# Patient Record
Sex: MALE | URBAN - METROPOLITAN AREA
[De-identification: names, ages, dates, MRNs, and addresses within clinical notes are randomized per-mention and may not be internally consistent; named-entity substitution may affect disease eponyms.]

---

## 2019-05-22 ENCOUNTER — APPOINTMENT (OUTPATIENT)
Age: 44
Setting detail: DERMATOLOGY
End: 2019-05-23

## 2019-05-22 VITALS — WEIGHT: 160 LBS | HEIGHT: 67 IN | RESPIRATION RATE: 14 BRPM

## 2019-05-22 DIAGNOSIS — L60.8 OTHER NAIL DISORDERS: ICD-10-CM

## 2019-05-22 PROCEDURE — OTHER NAIL CLIPPING FOR PAS: OTHER

## 2019-05-22 PROCEDURE — 99213 OFFICE O/P EST LOW 20 MIN: CPT

## 2019-05-22 PROCEDURE — OTHER COUNSELING: OTHER

## 2019-05-22 PROCEDURE — OTHER ORDER TESTS: OTHER

## 2019-05-22 PROCEDURE — OTHER PRESCRIPTION: OTHER

## 2019-05-22 RX ORDER — GENTAMICIN SULFATE 0.3 %
0.3% DROPS OPHTHALMIC (EYE) TID
Qty: 1 | Refills: 3 | Status: ERX | COMMUNITY
Start: 2019-05-22

## 2019-05-22 ASSESSMENT — LOCATION SIMPLE DESCRIPTION DERM: LOCATION SIMPLE: LEFT GREAT TOE

## 2019-05-22 ASSESSMENT — LOCATION ZONE DERM
LOCATION ZONE: TOENAIL
LOCATION ZONE: TOE

## 2019-05-22 ASSESSMENT — LOCATION DETAILED DESCRIPTION DERM
LOCATION DETAILED: LEFT DISTAL PLANTAR GREAT TOE
LOCATION DETAILED: LEFT GREAT TOENAIL

## 2019-05-22 NOTE — HPI: NAIL DISCOLORATION (MELANONYCHIA)
Is This A New Presentation, Or A Follow-Up?: Nail Discoloration
Additional History: Used ciclopyrox for 3 months with no improvement.

## 2019-05-22 NOTE — PROCEDURE: COUNSELING
Detail Level: Detailed
Patient Specific Counseling (Will Not Stick From Patient To Patient): Advised that this may not be fungus, but rather a result of the trauma that he reports leading up to this problem again. This dark color may be a result of pseudomonas bacteria or from debris that gets under the lifted now. Recommended a aerobic bacterial culture and putting for PAS steam to help guide treatment. In the meantime will prescribe gentamicin drops to be used three times per day empirically. Discuss the potential option for permanent nail removal and phenolization should tx fail. Recommend fine gentamicin three times per day for three months.

## 2019-06-05 ENCOUNTER — APPOINTMENT (OUTPATIENT)
Age: 44
Setting detail: DERMATOLOGY
End: 2019-06-05

## 2019-06-05 DIAGNOSIS — B35.1 TINEA UNGUIUM: ICD-10-CM

## 2019-06-05 PROCEDURE — 36415 COLL VENOUS BLD VENIPUNCTURE: CPT

## 2019-06-05 PROCEDURE — OTHER VENIPUNCTURE: OTHER

## 2019-06-05 PROCEDURE — OTHER ORDER TESTS: OTHER

## 2019-06-05 NOTE — PROCEDURE: VENIPUNCTURE
Bill For Individual Tests Below?: no
Number Of Tubes Drawn: 1
Venipuncture Paragraph: An alcohol pad was applied to the venipuncture site: L AC Fossa. Venipuncture was performed using a butterfly needle. Pressure and a bandaid was applied to the site. No complications were noted.
Detail Level: None

## 2019-08-01 ENCOUNTER — RX ONLY (RX ONLY)
Age: 44
End: 2019-08-01

## 2019-08-01 RX ORDER — TERBINAFINE HYDROCHLORIDE 250 MG/1
250MG TABLET ORAL QD
Qty: 30 | Refills: 0 | Status: ERX

## 2019-08-29 ENCOUNTER — APPOINTMENT (OUTPATIENT)
Age: 44
Setting detail: DERMATOLOGY
End: 2019-08-29

## 2019-08-29 VITALS — RESPIRATION RATE: 16 BRPM | WEIGHT: 165 LBS | HEIGHT: 66 IN

## 2019-08-29 DIAGNOSIS — B35.1 TINEA UNGUIUM: ICD-10-CM

## 2019-08-29 PROCEDURE — 36415 COLL VENOUS BLD VENIPUNCTURE: CPT

## 2019-08-29 PROCEDURE — 99213 OFFICE O/P EST LOW 20 MIN: CPT | Mod: 25

## 2019-08-29 PROCEDURE — OTHER COUNSELING: OTHER

## 2019-08-29 PROCEDURE — OTHER ORDER TESTS: OTHER

## 2019-08-29 PROCEDURE — OTHER VENIPUNCTURE: OTHER

## 2019-08-29 PROCEDURE — OTHER PRESCRIPTION: OTHER

## 2019-08-29 RX ORDER — TERBINAFINE HYDROCHLORIDE 250 MG/1
250MG TABLET ORAL
Qty: 30 | Refills: 1 | Status: ERX

## 2019-08-29 RX ORDER — CICLOPIROX 8 %
8% SOLUTION, NON-ORAL TOPICAL QD
Qty: 1 | Refills: 5 | Status: ERX | COMMUNITY
Start: 2019-08-29

## 2019-08-29 ASSESSMENT — LOCATION ZONE DERM
LOCATION ZONE: ARM
LOCATION ZONE: TOE

## 2019-08-29 ASSESSMENT — LOCATION DETAILED DESCRIPTION DERM
LOCATION DETAILED: RIGHT ANTECUBITAL SKIN
LOCATION DETAILED: LEFT DORSAL GREAT TOE

## 2019-08-29 ASSESSMENT — LOCATION SIMPLE DESCRIPTION DERM
LOCATION SIMPLE: RIGHT UPPER ARM
LOCATION SIMPLE: LEFT GREAT TOE

## 2019-08-29 NOTE — PROCEDURE: VENIPUNCTURE
Bill For Individual Tests Below?: no
Venipuncture Paragraph: - An alcohol pad was applied to the venipuncture site. \\n- Venipuncture was performed using a butterfly needle. \\n- Pressure and a band-aid was applied to the site. \\n- No complications were noted.
Number Of Tubes Drawn: 1
Detail Level: None

## 2019-08-29 NOTE — PROCEDURE: COUNSELING
Detail Level: Detailed
Patient Specific Counseling (Will Not Stick From Patient To Patient): - Reviewed the treatment course of oral terbinafine plus topical ciclopirox nail lacquer.\\n- Advised that it is normal for there to be little signs of improvement 1 month after starting the medication. However it appears that the pseudomonas has resolved. May discontinue gentamicin drops\\n- Blood work with office visit will be necessary today as well as 2 month from now. \\nPatient expressed understanding.

## 2019-08-29 NOTE — HPI: INFECTION (ONYCHOMYCOSIS)
How Severe Is It?: mild
Is This A New Presentation, Or A Follow-Up?: Follow Up Onychomycosis
Additional History: Patient has nail fungus and bateria in nail treated with gentamycon drops and was started on terbinafine. However he never followed up until now. However he reports improvement.  He started the terbinafine 3.5 weeks ago.

## 2019-10-30 ENCOUNTER — APPOINTMENT (OUTPATIENT)
Age: 44
Setting detail: DERMATOLOGY
End: 2019-10-30

## 2019-10-30 VITALS — WEIGHT: 160 LBS | HEIGHT: 67 IN | RESPIRATION RATE: 16 BRPM

## 2019-10-30 DIAGNOSIS — B35.1 TINEA UNGUIUM: ICD-10-CM

## 2019-10-30 PROCEDURE — 99213 OFFICE O/P EST LOW 20 MIN: CPT | Mod: 25

## 2019-10-30 PROCEDURE — OTHER VENIPUNCTURE: OTHER

## 2019-10-30 PROCEDURE — OTHER COUNSELING: OTHER

## 2019-10-30 PROCEDURE — OTHER ORDER TESTS: OTHER

## 2019-10-30 PROCEDURE — 36415 COLL VENOUS BLD VENIPUNCTURE: CPT

## 2019-10-30 PROCEDURE — OTHER PRESCRIPTION: OTHER

## 2019-10-30 RX ORDER — ITRACONAZOLE 100 MG/1
100MG CAPSULE ORAL ONCE A DAY
Qty: 60 | Refills: 0 | Status: ERX | COMMUNITY
Start: 2019-10-30

## 2019-10-30 ASSESSMENT — LOCATION ZONE DERM
LOCATION ZONE: ARM
LOCATION ZONE: TOE

## 2019-10-30 ASSESSMENT — LOCATION DETAILED DESCRIPTION DERM
LOCATION DETAILED: LEFT ANTECUBITAL SKIN
LOCATION DETAILED: LEFT DISTAL PLANTAR GREAT TOE

## 2019-10-30 ASSESSMENT — LOCATION SIMPLE DESCRIPTION DERM
LOCATION SIMPLE: LEFT GREAT TOE
LOCATION SIMPLE: LEFT ELBOW

## 2019-10-30 NOTE — PROCEDURE: COUNSELING
Patient Specific Counseling (Will Not Stick From Patient To Patient): Discussed there does not appear to be significant improvement with oral lamisil. Discussed alternative of itraconazole while also continuing ciclopyrox lacquor. Recommended filing nail htinner with [place] board. Patient Specific Counseling (Will Not Stick From Patient To Patient): Discussed there does not appear to be significant improvement with oral lamisil. Discussed alternative of itraconazole while also continuing ciclopyrox lacquor. Recommended filing nail htinner with [place] board.

## 2019-10-30 NOTE — HPI: INFECTION (ONYCHOMYCOSIS)
Is This A New Presentation, Or A Follow-Up?: Follow Up Onychomycosis
Additional History: Completed 3 months terbinafine.

## 2019-12-04 ENCOUNTER — APPOINTMENT (OUTPATIENT)
Age: 44
Setting detail: DERMATOLOGY
End: 2019-12-04

## 2019-12-04 VITALS — WEIGHT: 160 LBS | HEIGHT: 67 IN | RESPIRATION RATE: 16 BRPM

## 2019-12-04 DIAGNOSIS — L85.3 XEROSIS CUTIS: ICD-10-CM

## 2019-12-04 DIAGNOSIS — B35.1 TINEA UNGUIUM: ICD-10-CM

## 2019-12-04 PROCEDURE — OTHER COUNSELING: OTHER

## 2019-12-04 PROCEDURE — 99213 OFFICE O/P EST LOW 20 MIN: CPT | Mod: 25

## 2019-12-04 PROCEDURE — OTHER PRESCRIPTION: OTHER

## 2019-12-04 PROCEDURE — OTHER VENIPUNCTURE: OTHER

## 2019-12-04 PROCEDURE — OTHER ORDER TESTS: OTHER

## 2019-12-04 PROCEDURE — 36415 COLL VENOUS BLD VENIPUNCTURE: CPT

## 2019-12-04 PROCEDURE — OTHER ADDITIONAL NOTES: OTHER

## 2019-12-04 ASSESSMENT — LOCATION SIMPLE DESCRIPTION DERM
LOCATION SIMPLE: LEFT GREAT TOE
LOCATION SIMPLE: LEFT ELBOW
LOCATION SIMPLE: RIGHT THIGH
LOCATION SIMPLE: UPPER BACK

## 2019-12-04 ASSESSMENT — LOCATION ZONE DERM
LOCATION ZONE: TRUNK
LOCATION ZONE: ARM
LOCATION ZONE: LEG
LOCATION ZONE: TOENAIL

## 2019-12-04 ASSESSMENT — LOCATION DETAILED DESCRIPTION DERM
LOCATION DETAILED: LEFT ANTECUBITAL SKIN
LOCATION DETAILED: LEFT GREAT TOENAIL
LOCATION DETAILED: INFERIOR THORACIC SPINE
LOCATION DETAILED: RIGHT ANTERIOR DISTAL THIGH

## 2019-12-04 NOTE — PROCEDURE: COUNSELING
Detail Level: Detailed
Detail Level: Zone
Patient Specific Counseling (Will Not Stick From Patient To Patient): Continue itraconazole 2 po qd. Nurse added the prescription twice at different times and was sent at 2 different times in error.

## 2019-12-04 NOTE — HPI: INFECTION (ONYCHOMYCOSIS)
Is This A New Presentation, Or A Follow-Up?: Follow Up Onychomycosis
Additional History: Here for one month follow-up of nail fungus. Patient had completed three months of terbinafine and was switched to itraconazole 200mg qd. He thinks this is working better.

## 2019-12-04 NOTE — HPI: RASH
How Severe Is Your Rash?: mild
Is This A New Presentation, Or A Follow-Up?: Rash
Additional History: Gets mild occasional itchy skin on back and thighs. No rash today. Improved with moisturiser.

## 2020-02-07 ENCOUNTER — APPOINTMENT (OUTPATIENT)
Age: 45
Setting detail: DERMATOLOGY
End: 2020-02-07

## 2020-02-07 VITALS — RESPIRATION RATE: 16 BRPM | HEIGHT: 66 IN | WEIGHT: 160 LBS

## 2020-02-07 DIAGNOSIS — B35.1 TINEA UNGUIUM: ICD-10-CM

## 2020-02-07 PROCEDURE — OTHER COUNSELING: OTHER

## 2020-02-07 PROCEDURE — OTHER ORDER TESTS: OTHER

## 2020-02-07 PROCEDURE — OTHER VENIPUNCTURE: OTHER

## 2020-02-07 PROCEDURE — 36415 COLL VENOUS BLD VENIPUNCTURE: CPT

## 2020-02-07 PROCEDURE — 99213 OFFICE O/P EST LOW 20 MIN: CPT | Mod: 25

## 2020-02-07 ASSESSMENT — LOCATION SIMPLE DESCRIPTION DERM
LOCATION SIMPLE: LEFT ELBOW
LOCATION SIMPLE: LEFT GREAT TOE

## 2020-02-07 ASSESSMENT — LOCATION ZONE DERM
LOCATION ZONE: ARM
LOCATION ZONE: TOENAIL

## 2020-02-07 ASSESSMENT — LOCATION DETAILED DESCRIPTION DERM
LOCATION DETAILED: LEFT GREAT TOENAIL
LOCATION DETAILED: LEFT ANTECUBITAL SKIN

## 2020-02-07 NOTE — HPI: INFECTION (ONYCHOMYCOSIS)
Is This A New Presentation, Or A Follow-Up?: Follow Up Onychomycosis
Additional History: Completed 3 months itraconazole 200mg qd.

## 2020-02-07 NOTE — PROCEDURE: COUNSELING
Detail Level: Detailed
Patient Specific Counseling (Will Not Stick From Patient To Patient): Discontinue itraconazole. Continue topical cyclopyrox. Recommended using on left great tone nail qd until it grows out completely. Apply to all toenails q week for maintenance. Patient expressed understanding.

## 2020-02-20 ENCOUNTER — RX ONLY (RX ONLY)
Age: 45
End: 2020-02-20

## 2020-02-20 RX ORDER — CICLOPIROX 80 MG/ML
8% SOLUTION TOPICAL QD
Qty: 1 | Refills: 11 | Status: ERX | COMMUNITY
Start: 2020-02-20

## 2020-07-22 ENCOUNTER — APPOINTMENT (OUTPATIENT)
Age: 45
Setting detail: DERMATOLOGY
End: 2020-07-22

## 2020-07-22 VITALS — RESPIRATION RATE: 14 BRPM | WEIGHT: 160 LBS | HEIGHT: 66 IN

## 2020-07-22 DIAGNOSIS — L60.0 INGROWING NAIL: ICD-10-CM

## 2020-07-22 DIAGNOSIS — B35.1 TINEA UNGUIUM: ICD-10-CM

## 2020-07-22 PROCEDURE — 11900 INJECT SKIN LESIONS </W 7: CPT

## 2020-07-22 PROCEDURE — OTHER COUNSELING: OTHER

## 2020-07-22 PROCEDURE — 99213 OFFICE O/P EST LOW 20 MIN: CPT | Mod: 25

## 2020-07-22 PROCEDURE — OTHER INTRALESIONAL KENALOG: OTHER

## 2020-07-22 ASSESSMENT — LOCATION ZONE DERM
LOCATION ZONE: TOE
LOCATION ZONE: TOENAIL

## 2020-07-22 ASSESSMENT — LOCATION SIMPLE DESCRIPTION DERM: LOCATION SIMPLE: LEFT GREAT TOE

## 2020-07-22 ASSESSMENT — LOCATION DETAILED DESCRIPTION DERM
LOCATION DETAILED: LEFT DISTAL PLANTAR GREAT TOE
LOCATION DETAILED: LEFT GREAT TOENAIL

## 2020-07-22 NOTE — HPI: RASH
How Severe Is Your Rash?: mild
Is This A New Presentation, Or A Follow-Up?: Rash
Additional History: History of onychomycosis of this nail. This is becoming ingrown on the medial portion. Pain has improved but now painful with pressure.

## 2020-07-22 NOTE — PROCEDURE: COUNSELING
Detail Level: Detailed
Patient Specific Counseling (Will Not Stick From Patient To Patient): Nials appear clear today. Patient will continue using 3x a week , ok to refill cyclopirox for a year.
Patient Specific Counseling (Will Not Stick From Patient To Patient): Recommended filing the top of the nail regularly to make the lateral edges fan up. Discussed  letting the free edge of the nail grow out. Discussed the cotton technique as well. Recommended intralesional Kenalog today. Should this not be effective in relieving his symptoms, return to clinic in one week for 30 minute partial nail avulsion with or without phenolization.
Detail Level: Simple

## 2022-02-04 ENCOUNTER — APPOINTMENT (OUTPATIENT)
Dept: URBAN - METROPOLITAN AREA CLINIC 256 | Age: 47
Setting detail: DERMATOLOGY
End: 2022-02-04

## 2022-02-04 VITALS — WEIGHT: 165 LBS | HEIGHT: 67 IN | RESPIRATION RATE: 16 BRPM

## 2022-02-04 DIAGNOSIS — L64.8 OTHER ANDROGENIC ALOPECIA: ICD-10-CM

## 2022-02-04 PROCEDURE — 99212 OFFICE O/P EST SF 10 MIN: CPT

## 2022-02-04 PROCEDURE — OTHER COUNSELING: OTHER

## 2022-02-04 PROCEDURE — OTHER ADDITIONAL NOTES: OTHER

## 2022-02-04 PROCEDURE — OTHER MIPS QUALITY: OTHER

## 2022-02-04 ASSESSMENT — LOCATION ZONE DERM: LOCATION ZONE: SCALP

## 2022-02-04 ASSESSMENT — LOCATION DETAILED DESCRIPTION DERM: LOCATION DETAILED: LEFT MEDIAL FRONTAL SCALP

## 2022-02-04 ASSESSMENT — LOCATION SIMPLE DESCRIPTION DERM: LOCATION SIMPLE: LEFT SCALP

## 2022-02-04 NOTE — HPI: HAIR LOSS
How Did The Hair Loss Occur?: gradual in onset
How Severe Is Your Hair Loss?: moderate
What Hair Products Do You Use?: Selsun blue
Additional History: Patient was urinating blood a year ago. It resolved with no diagnosis. There are no underlying conditions.

## 2022-02-04 NOTE — PROCEDURE: ADDITIONAL NOTES
Detail Level: Zone
Render Risk Assessment In Note?: no
Additional Notes: Discussed that there in only one other person in patients family with signs of hair loss. \\nDiscussed biopsy vs. blood work vs. Finasteride vs hair transplant vs PRP.\\nRecommend seeing Dariana Díaz.